# Patient Record
Sex: MALE | Race: BLACK OR AFRICAN AMERICAN | Employment: OTHER | URBAN - METROPOLITAN AREA
[De-identification: names, ages, dates, MRNs, and addresses within clinical notes are randomized per-mention and may not be internally consistent; named-entity substitution may affect disease eponyms.]

---

## 2018-05-09 ENCOUNTER — TELEPHONE (OUTPATIENT)
Dept: CARDIOLOGY CLINIC | Age: 83
End: 2018-05-09

## 2018-05-09 NOTE — TELEPHONE ENCOUNTER
LMOM of medical records/ appt  at [facility] and [address] [place] [address] to obtain [place] insurance referral for patient.

## 2018-05-11 ENCOUNTER — OFFICE VISIT (OUTPATIENT)
Dept: CARDIOLOGY CLINIC | Age: 83
End: 2018-05-11

## 2018-05-11 VITALS
BODY MASS INDEX: 18.06 KG/M2 | HEART RATE: 97 BPM | HEIGHT: 71 IN | DIASTOLIC BLOOD PRESSURE: 75 MMHG | WEIGHT: 129 LBS | SYSTOLIC BLOOD PRESSURE: 141 MMHG | OXYGEN SATURATION: 98 %

## 2018-05-11 DIAGNOSIS — R55 PRE-SYNCOPE: ICD-10-CM

## 2018-05-11 DIAGNOSIS — I10 ESSENTIAL HYPERTENSION: Primary | ICD-10-CM

## 2018-05-11 RX ORDER — QUETIAPINE FUMARATE 25 MG/1
TABLET, FILM COATED ORAL
COMMUNITY

## 2018-05-11 RX ORDER — LAMOTRIGINE 200 MG/1
TABLET ORAL DAILY
COMMUNITY

## 2018-05-11 RX ORDER — ATORVASTATIN CALCIUM 10 MG/1
TABLET, FILM COATED ORAL DAILY
COMMUNITY

## 2018-05-11 RX ORDER — POTASSIUM CHLORIDE 750 MG/1
TABLET, FILM COATED, EXTENDED RELEASE ORAL
COMMUNITY

## 2018-05-11 NOTE — MR AVS SNAPSHOT
[address] [facility] [address] [facility] [address] [id number] 
[phone] Patient: Ced Graham MRN: [id number] :1935 Visit Information Date & Time Provider Department Dept. Phone Encounter #  
 2018  1:30 PM Scotty Brady  [facility] Specialist at [facility] [phone] [id number] Follow-up Instructions Return in about 4 weeks (around 2018). Upcoming Health Maintenance Date Due DTaP/Tdap/Td series (1 - Tdap) 1956 ZOSTER VACCINE AGE 60> 1996 GLAUCOMA SCREENING Q2Y 2001 Pneumococcal 65+ Low/Medium Risk (1 of 2 - PCV13) 2001 MEDICARE YEARLY EXAM 3/28/2018 Influenza Age 5 to Adult 2018 Allergies as of 2018  Review Complete On: 2018 By: Cherry Goodwin RN No Known Allergies Current Immunizations  Never Reviewed Name Date Influenza Vaccine PF 2013  4:34 PM  
  
 Not reviewed this visit You Were Diagnosed With   
  
 Codes Comments Essential hypertension    -  Primary ICD-10-CM: I10 
ICD-9-CM: 401.9 Pre-syncope     ICD-10-CM: R55 
ICD-9-CM: 780. 2 Vitals BP Pulse Height(growth percentile) Weight(growth percentile) SpO2 BMI  
 141/75 97 5' 11\" (1.803 m) 129 lb (58.5 kg) 98% 17.99 kg/m2 Smoking Status Never Smoker BMI and BSA Data Body Mass Index Body Surface Area  
 17.99 kg/m 2 1.71 m 2 Your Updated Medication List  
  
   
This list is accurate as of 18  2:05 PM.  Always use your most recent med list. amLODIPine 10 mg tablet Commonly known as:  Erenest Bryon Take 1 Tab by mouth daily. aspirin 81 mg chewable tablet Take 1 Tab by mouth daily. cholecalciferol 1,000 unit tablet Commonly known as:  VITAMIN D3 Take 1 Tab by mouth daily. citalopram 20 mg tablet Commonly known as:  Sable Deaner Take 1 Tab by mouth daily. donepezil 5 mg tablet Commonly known as:  ARICEPT Take 1 Tab by mouth nightly. LaMICtal 200 mg tablet Generic drug:  lamoTRIgine Take  by mouth daily. LIPITOR 10 mg tablet Generic drug:  atorvastatin Take  by mouth daily. lisinopril 40 mg tablet Commonly known as:  Connor Tomy Take 1 Tab by mouth daily. LORazepam 0.5 mg tablet Commonly known as:  ATIVAN Take 1 Tab by mouth two (2) times daily as needed for Anxiety. potassium chloride SR 10 mEq tablet Commonly known as:  KLOR-CON 10 Take  by mouth. QUEtiapine 25 mg tablet Commonly known as:  SEROquel Take  by mouth. simvastatin 10 mg tablet Commonly known as:  ZOCOR Take 1 Tab by mouth nightly. We Performed the Following AMB POC EKG ROUTINE W/ 12 LEADS, INTER & REP [41424 CPT(R)] Follow-up Instructions Return in about 4 weeks (around 6/8/2018). Introducing [facility]! Kirill Johnson introduces BUILD patient portal. Now you can access parts of your medical record, email your doctor's office, and request medication refills online. 1. In your internet browser, go to [email or web address] 2. Click on the First Time User? Click Here link in the Sign In box. You will see the New Member Sign Up page. 3. Enter your BUILD Access Code exactly as it appears below. You will not need to use this code after youve completed the sign-up process. If you do not sign up before the expiration date, you must request a new code. · BUILD Access Code: [email or web address] Expires: 8/9/2018  2:05 PM 
 
4. Enter the last four digits of your Social Security Number (xxxx) and Date of Birth (mm/dd/yyyy) as indicated and click Submit. You will be taken to the next sign-up page. 5. Create a BUILD ID. This will be your BUILD login ID and cannot be changed, so think of one that is secure and easy to remember. 6. Create a Tripl password. You can change your password at any time. 7. Enter your Password Reset Question and Answer. This can be used at a later time if you forget your password. 8. Enter your e-mail address. You will receive e-mail notification when new information is available in [address] 9. Click Sign Up. You can now view and download portions of your medical record. 10. Click the Download Summary menu link to download a portable copy of your medical information. If you have questions, please visit the Frequently Asked Questions section of the Tripl website. Remember, Tripl is NOT to be used for urgent needs. For medical emergencies, dial 911. Now available from your iPhone and Android! Please provide this summary of care documentation to your next provider. Your primary care clinician is listed as Jairo Boyce. If you have any questions after today's visit, please call [phone].

## 2018-05-11 NOTE — PROGRESS NOTES
Cardiovascular Specialists    Mr. Kevon Hickman is an 81year old male with a history of Alzheimer's dementia, adjustment disorder, hypertension. Mr. Kevon Hickman is here in my clinic today along with the niece. Mr. Kevon Hickman lives in a nursing home. According to niece, he was asked to come see us for abnormal EKG. Exact history is not available for the reason behind EKG. According to Mr. Kevon Hickman and the niece, he does not have any prior history of myocardial infarction or congestive heart failure. He denies any chest pain or chest tightness. He uses 1-2 pillows at night. He has some baseline dementia. According to niece, for the last five years he has been experiencing episodes where he becomes suddenly disoriented, altered mental status and fatigued and tired. There is no associated hemodynamic instability. This episode lasts probably 30-60 minutes, where he has to sit down and relax. According to niece, occasionally he would have bladder incontinence during these events. This happens probably once a month or once every other month. This has been going on for the last five years. However according to niece he never loses consciousness, he just becomes disoriented. Denies any nausea, vomiting, abdominal pain, fever, chills, sputum production. No hematuria or other bleeding complaints    Past Medical History:   Diagnosis Date    Adjustment disorder     Alzheimer's dementia     Hypertension     Vitamin D deficiency          No past surgical history on file. Current Outpatient Prescriptions   Medication Sig    lamoTRIgine (LAMICTAL) 200 mg tablet Take  by mouth daily.  potassium chloride SR (KLOR-CON 10) 10 mEq tablet Take  by mouth.  QUEtiapine (SEROQUEL) 25 mg tablet Take  by mouth.  atorvastatin (LIPITOR) 10 mg tablet Take  by mouth daily.  cholecalciferol (VITAMIN D3) 1,000 unit tablet Take 1 Tab by mouth daily.     donepezil (ARICEPT) 5 mg tablet Take 1 Tab by mouth nightly.  aspirin 81 mg chewable tablet Take 1 Tab by mouth daily.  amLODIPine (NORVASC) 10 mg tablet Take 1 Tab by mouth daily.  citalopram (CELEXA) 20 mg tablet Take 1 Tab by mouth daily.  lisinopril (PRINIVIL, ZESTRIL) 40 mg tablet Take 1 Tab by mouth daily.  LORazepam (ATIVAN) 0.5 mg tablet Take 1 Tab by mouth two (2) times daily as needed for Anxiety.  simvastatin (ZOCOR) 10 mg tablet Take 1 Tab by mouth nightly. No current facility-administered medications for this visit. Allergies and Sensitivities:  No Known Allergies    Family History:  No family history on file. Social History:  Social History   Substance Use Topics    Smoking status: Never Smoker    Smokeless tobacco: Never Used    Alcohol use None     He  reports that he has never smoked. He has never used smokeless tobacco.  He  has no alcohol history on file. Review of Systems:  Cardiac symptoms as noted above in HPI. All others negative. Denies fatigue, malaise, skin rash, joint pain, blurring vision, photophobia, neck pain, hemoptysis, chronic cough, nausea, vomiting, hematuria, burning micturition, BRBPR, chronic headaches. Physical Exam:  BP Readings from Last 3 Encounters:   05/11/18 141/75   11/04/13 154/73         Pulse Readings from Last 3 Encounters:   05/11/18 97   11/04/13 61          Wt Readings from Last 3 Encounters:   05/11/18 129 lb (58.5 kg)   11/04/13 130 lb 12.8 oz (59.3 kg)       Constitutional: Oriented to person, place, and time. HENT: Head: Normocephalic and atraumatic. Eyes: Conjunctivae and extraocular motions are normal.   Neck: No JVD present. Carotid bruit is not appreciated. Cardiovascular: Regular rhythm. No murmur, gallop or rubs appreciated  Lung: Breath sounds normal. No respiratory distress. No ronchi or rales appreciated  Abdominal: No tenderness. No rebound and no guarding. Musculoskeletal: There is no lower extremity edema.  No cynosis  Lymphadenopathy:  No cervical or supraclavicular adenopathy appriciated. Neurological: No gross motor deficit noted. Skin: No visible skin rash noted. No Ear discharge noted  Psychiatric: Normal mood and affect. Good distal pulse    Review of Data  LABS:   Lab Results   Component Value Date/Time    Sodium 145 11/04/2013 04:13 AM    Potassium 3.1 (L) 11/04/2013 04:13 AM    Chloride 105 11/04/2013 04:13 AM    CO2 33 (H) 11/04/2013 04:13 AM    Glucose 93 11/04/2013 04:13 AM    BUN 22 (H) 11/04/2013 04:13 AM    Creatinine 0.98 11/04/2013 04:13 AM     No flowsheet data found. Lab Results   Component Value Date/Time    ALT (SGPT) 14 11/03/2013 02:10 AM     Lab Results   Component Value Date/Time    Hemoglobin A1c 5.6 11/02/2013 10:15 AM       EKG  (05/18) Sinus rhythm at 80 bpm. LVH with repolarization abnormality. Possible old septal infarct. ECHO    IMPRESSION & PLAN:  Mr. Raeann Bridges is an 81year old male with a history of hypertension, Alzheimer's disease and dementia. Abnormal EKG:  I reviewed EKG from nursing home and also EKG from today. EKG showed sinus rhythm with left ventricular hypertrophy and secondary ST-T changes, possible old anteroseptal infarct, however there is no prior history of clinical MI. Echocardiogram will be performed to evaluate his ejection fraction and any wall motion abnormality in the setting of hypertension. Alzheimer's dementia: Mr. Raeann Bridges has Alzheimer's disease and some dementia. He is accompanied with the family member. According to niece, he has been experiencing this episode of disorientation and altered level of consciousness along with occasional bladder incontinence for the last four to five years. According to niece, no further diagnosis has been made. She tells me that she does not believe he has been seen by neurology in the past.  Considering his symptoms, I believe this could be some sort of atypical seizure disorder as well.   I recommend that he should be seen by neurology team.  Antony is in the healthcare field and is a CNA and is going to discuss this with nursing home physician and recommend neurology evaluation. Hypertension:  Currently he is on Amlodipine 10 mg daily. Echocardiogram has been ordered to rule out hypertensive cardiovascular heart disease. Continue Amlodipine at this time. BP today is 140/75 mmHg. This plan was discussed with patient and niece who is in agreement. Thank you for allowing me to participate in patient care. Please feel free to call me if you have any question or concern. Meghann Benjamin MD  Please note: This document has been produced using voice recognition software. Unrecognized errors in transcription may be present.

## 2018-05-15 DIAGNOSIS — G40.909 SEIZURE DISORDER (HCC): Primary | ICD-10-CM

## 2018-05-15 NOTE — PROGRESS NOTES
Sleep Study referral to Dr. Crystal Edwards per Dr. Santiago Vega.        Verbal order and read back per Nevaeh Cruz MD

## 2018-06-13 ENCOUNTER — HOSPITAL ENCOUNTER (OUTPATIENT)
Dept: NON INVASIVE DIAGNOSTICS | Age: 83
Discharge: HOME | End: 2018-06-13
Attending: INTERNAL MEDICINE
Payer: MEDICARE

## 2018-06-13 DIAGNOSIS — I10 ESSENTIAL HYPERTENSION: ICD-10-CM

## 2018-06-13 DIAGNOSIS — R55 PRE-SYNCOPE: ICD-10-CM

## 2018-06-13 PROCEDURE — 93306 TTE W/DOPPLER COMPLETE: CPT

## 2018-06-15 ENCOUNTER — OFFICE VISIT (OUTPATIENT)
Dept: CARDIOLOGY CLINIC | Age: 83
End: 2018-06-15

## 2018-06-15 VITALS
HEIGHT: 71 IN | OXYGEN SATURATION: 98 % | SYSTOLIC BLOOD PRESSURE: 151 MMHG | HEART RATE: 81 BPM | WEIGHT: 129 LBS | DIASTOLIC BLOOD PRESSURE: 75 MMHG | BODY MASS INDEX: 18.06 KG/M2

## 2018-06-15 DIAGNOSIS — I10 HYPERTENSION, UNSPECIFIED TYPE: Primary | ICD-10-CM

## 2018-06-15 DIAGNOSIS — E78.5 DYSLIPIDEMIA: ICD-10-CM

## 2018-06-15 RX ORDER — LISINOPRIL 2.5 MG/1
2.5 TABLET ORAL DAILY
Qty: 30 TAB | Refills: 6 | Status: SHIPPED | OUTPATIENT
Start: 2018-06-15 | End: 2018-06-18 | Stop reason: ALTCHOICE

## 2018-06-15 NOTE — MR AVS SNAPSHOT
[address] [facility] [address] [facility] [address] 
[phone] Patient: Dmitry Ramos MRN: [id number] :1935 Visit Information Date & Time Provider Department Dept. Phone Encounter #  
 6/15/2018  1:45 PM Scotty Finn  [facility] Specialist at [facility] [phone] [id number] Your Appointments 2018 11:00 AM  
New Patient with Dieudonne Ordaz MD  
[employer] [facility] Appt Note: Dr. Santiago Vega; dementia & poss dementia; notes in cc; [address] [phone] ext [phone]; forms faxed to Nursing facility. .... Cheryl Tellez ywp; aware family member to attend appt w/pt. ..ywp; Home called and moved to [address] [place] [id number] [phone]  
  
   
 [facility] [id number] Upcoming Health Maintenance Date Due DTaP/Tdap/Td series (1 - Tdap) 1957 ZOSTER VACCINE AGE 60> 1996 GLAUCOMA SCREENING Q2Y 2001 Pneumococcal 65+ Low/Medium Risk (1 of 2 - PCV13) 2001 Influenza Age 5 to Adult 2018 Allergies as of 6/15/2018  Review Complete On: 6/15/2018 By: Melanie Isbell RN No Known Allergies Current Immunizations  Never Reviewed Name Date Influenza Vaccine PF 2013  4:34 PM  
  
 Not reviewed this visit You Were Diagnosed With   
  
 Codes Comments Hypertension, unspecified type    -  Primary ICD-10-CM: I10 
ICD-9-CM: 401.9 Dyslipidemia     ICD-10-CM: E78.5 ICD-9-CM: 272.4 Vitals BP Pulse Height(growth percentile) Weight(growth percentile) SpO2 BMI  
 151/75 81 5' 11\" (1.803 m) 129 lb (58.5 kg) 98% 17.99 kg/m2 Smoking Status Never Smoker BMI and BSA Data Body Mass Index Body Surface Area  
 17.99 kg/m 2 1.71 m 2 Your Updated Medication List  
  
   
This list is accurate as of 6/15/18  2:49 PM.  Always use your most recent med list.  
  
  
  
 amLODIPine 10 mg tablet Commonly known as:  Jose Alberto Montano Take 1 Tab by mouth daily. aspirin 81 mg chewable tablet Take 1 Tab by mouth daily. cholecalciferol 1,000 unit tablet Commonly known as:  VITAMIN D3 Take 1 Tab by mouth daily. citalopram 20 mg tablet Commonly known as:  Leonel Hopes Take 1 Tab by mouth daily. donepezil 5 mg tablet Commonly known as:  ARICEPT Take 1 Tab by mouth nightly. LaMICtal 200 mg tablet Generic drug:  lamoTRIgine Take  by mouth daily. LIPITOR 10 mg tablet Generic drug:  atorvastatin Take  by mouth daily. lisinopril 2.5 mg tablet Commonly known as:  Lauren Denver Take 1 Tab by mouth daily. LORazepam 0.5 mg tablet Commonly known as:  ATIVAN Take 1 Tab by mouth two (2) times daily as needed for Anxiety. potassium chloride SR 10 mEq tablet Commonly known as:  KLOR-CON 10 Take  by mouth. QUEtiapine 25 mg tablet Commonly known as:  SEROquel Take  by mouth. simvastatin 10 mg tablet Commonly known as:  ZOCOR Take 1 Tab by mouth nightly. Prescriptions Printed Refills  
 lisinopril (PRINIVIL, ZESTRIL) 2.5 mg tablet 6 Sig: Take 1 Tab by mouth daily. Class: Print Route: Oral  
  
Introducing [facility]! Omid Kauffman introduces [employer] patient portal. Now you can access parts of your medical record, email your doctor's office, and request medication refills online. 1. In your internet browser, go to [email or web address] 2. Click on the First Time User? Click Here link in the Sign In box. You will see the New Member Sign Up page. 3. Enter your [employer] Access Code exactly as it appears below. You will not need to use this code after youve completed the sign-up process. If you do not sign up before the expiration date, you must request a new code. · [employer] Access Code: [email or web address] Expires: 8/9/2018  2:05 PM 
 
 4. Enter the last four digits of your Social Security Number (xxxx) and Date of Birth (mm/dd/yyyy) as indicated and click Submit. You will be taken to the next sign-up page. 5. Create a Denator ID. This will be your Denator login ID and cannot be changed, so think of one that is secure and easy to remember. 6. Create a Denator password. You can change your password at any time. 7. Enter your Password Reset Question and Answer. This can be used at a later time if you forget your password. 8. Enter your e-mail address. You will receive e-mail notification when new information is available in [address] 9. Click Sign Up. You can now view and download portions of your medical record. 10. Click the Download Summary menu link to download a portable copy of your medical information. If you have questions, please visit the Frequently Asked Questions section of the Denator website. Remember, Denator is NOT to be used for urgent needs. For medical emergencies, dial 911. Now available from your iPhone and Android! Please provide this summary of care documentation to your next provider. Your primary care clinician is listed as No Schneider. If you have any questions after today's visit, please call [phone].

## 2018-06-15 NOTE — PROGRESS NOTES
Cardiovascular Specialists    Mr. Dana Pike is an 81 y.o. male with a history of cardiomyopathy, Alzheimer's dementia, adjustment disorder, hypertension. Mr. Dana Pike is here today for follow up appointment. He had an echocardiogram done since last visit. He has no symptoms to report. He has Alzheimer's dementia. He is accompanied with the CNA from the nursing home. Mr. Dana Pike denies any symptoms to suggest angina or heart failure. He uses one pillow at night. He denies any palpitations, presyncope or syncope. He denies any lower extremity swelling. Denies any nausea, vomiting, abdominal pain, fever, chills, sputum production. No hematuria or other bleeding complaints    Past Medical History:   Diagnosis Date    Adjustment disorder     Alzheimer's dementia     [facility]     LVEF 45% (06/18)     Hypertension     Vitamin D deficiency          No past surgical history on file. Current Outpatient Prescriptions   Medication Sig    lamoTRIgine (LAMICTAL) 200 mg tablet Take  by mouth daily.  potassium chloride SR (KLOR-CON 10) 10 mEq tablet Take  by mouth.  QUEtiapine (SEROQUEL) 25 mg tablet Take  by mouth.  atorvastatin (LIPITOR) 10 mg tablet Take  by mouth daily.  cholecalciferol (VITAMIN D3) 1,000 unit tablet Take 1 Tab by mouth daily.  aspirin 81 mg chewable tablet Take 1 Tab by mouth daily.  amLODIPine (NORVASC) 10 mg tablet Take 1 Tab by mouth daily.  citalopram (CELEXA) 20 mg tablet Take 1 Tab by mouth daily.  donepezil (ARICEPT) 5 mg tablet Take 1 Tab by mouth nightly.  lisinopril (PRINIVIL, ZESTRIL) 40 mg tablet Take 1 Tab by mouth daily.  LORazepam (ATIVAN) 0.5 mg tablet Take 1 Tab by mouth two (2) times daily as needed for Anxiety.  simvastatin (ZOCOR) 10 mg tablet Take 1 Tab by mouth nightly. No current facility-administered medications for this visit.         Allergies and Sensitivities:  No Known Allergies    Family History:  No family history on file. Social History:  Social History   Substance Use Topics    Smoking status: Never Smoker    Smokeless tobacco: Never Used    Alcohol use None     He  reports that he has never smoked. He has never used smokeless tobacco.  He  has no alcohol history on file. Review of Systems:  Cardiac symptoms as noted above in HPI. All others negative. Denies  blurring vision, photophobia, neck pain, hemoptysis, chronic cough, nausea, vomiting, hematuria, burning micturition, BRBPR, chronic headaches. Physical Exam:  BP Readings from Last 3 Encounters:   06/15/18 151/75   05/11/18 141/75   11/04/13 154/73         Pulse Readings from Last 3 Encounters:   06/15/18 81   05/11/18 97   11/04/13 61          Wt Readings from Last 3 Encounters:   06/15/18 129 lb (58.5 kg)   05/11/18 129 lb (58.5 kg)   11/04/13 130 lb 12.8 oz (59.3 kg)       Constitutional: Oriented to person, place, and time. HENT: Head: Normocephalic and atraumatic. Neck: No JVD present. Cardiovascular: Regular rhythm. No murmur, gallop or rubs appreciated  Lung: Breath sounds normal. No respiratory distress. No ronchi or rales appreciated  Abdominal: No tenderness. No rebound and no guarding. Musculoskeletal: There is no lower extremity edema. No cynosis      Review of Data  LABS:   Lab Results   Component Value Date/Time    Sodium 145 11/04/2013 04:13 AM    Potassium 3.1 (L) 11/04/2013 04:13 AM    Chloride 105 11/04/2013 04:13 AM    CO2 33 (H) 11/04/2013 04:13 AM    Glucose 93 11/04/2013 04:13 AM    BUN 22 (H) 11/04/2013 04:13 AM    Creatinine 0.98 11/04/2013 04:13 AM     No flowsheet data found. Lab Results   Component Value Date/Time    ALT (SGPT) 14 11/03/2013 02:10 AM     Lab Results   Component Value Date/Time    Hemoglobin A1c 5.6 11/02/2013 10:15 AM       EKG  (05/18) Sinus rhythm at 80 bpm. LVH with repolarization abnormality. Possible old septal infarct.      ECHO (05/18)  LEFT VENTRICLE: Size was normal. Ejection fraction was estimated to be 45 %. There was diffuse hypokinesis. RIGHT VENTRICLE: The size was normal. Systolic function was normal. There   were no regional wall motion abnormalities. LEFT ATRIUM: Size was normal.  RIGHT ATRIUM: Size was normal.  MITRAL VALVE: Normal valve structure. DOPPLER: There was trivial regurgitation. AORTIC VALVE: Leaflets exhibited mildly increased thickness. DOPPLER: There was mild to moderate regurgitation. IMPRESSION & PLAN:  Mr. Sarah Salas is an 81 y.o. male with a history of hypertension, Alzheimer's disease and dementia. 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  Salt restriction advised. Hypertension:  Currently he is on Amlodipine 10 mg daily. Starting lisinopril 2.5 mg daily for LV dysfunction. .    Thank you for allowing me to participate in patient care. Please feel free to call me if you have any question or concern. Jordi Coombs MD  Please note: This document has been produced using voice recognition software. Unrecognized errors in transcription may be present.

## 2018-06-18 RX ORDER — LOSARTAN POTASSIUM 100 MG/1
100 TABLET ORAL DAILY
Qty: 30 TAB | Refills: 6 | Status: CANCELLED | OUTPATIENT
Start: 2018-06-18

## 2018-06-18 RX ORDER — LOSARTAN POTASSIUM 25 MG/1
25 TABLET ORAL DAILY
Qty: 30 TAB | Refills: 6 | Status: SHIPPED | OUTPATIENT
Start: 2018-06-18

## 2018-06-18 RX ORDER — LOSARTAN POTASSIUM 25 MG/1
TABLET ORAL DAILY
COMMUNITY
End: 2018-06-18 | Stop reason: SDUPTHER

## 2018-06-18 NOTE — TELEPHONE ENCOUNTER
Radha De Leon called from [facility] [phone] to advise that they have Lisinopril listed as an allergy for patient. She advised that she does not have reaction because patient came to their facility with Lisinopril listed as allergy. Lisinopril was prescribed by Dr. Juana Ortega during last office visit because we were unaware of allergy at that time. Facility is requesting new Rx. Per Dr. Juana Ortega, change to Cozaar 25 mg daily. Will fax Rx to [phone].      Verbal order and read back per Finn Lemos MD

## 2018-07-06 ENCOUNTER — HOSPITAL ENCOUNTER (EMERGENCY)
Age: 83
Discharge: HOME | End: 2018-07-06
Attending: EMERGENCY MEDICINE | Admitting: EMERGENCY MEDICINE
Payer: MEDICARE

## 2018-07-06 ENCOUNTER — APPOINTMENT (OUTPATIENT)
Dept: GENERAL RADIOLOGY | Age: 83
End: 2018-07-06
Attending: EMERGENCY MEDICINE
Payer: MEDICARE

## 2018-07-06 VITALS
TEMPERATURE: 97.3 F | HEART RATE: 70 BPM | RESPIRATION RATE: 18 BRPM | SYSTOLIC BLOOD PRESSURE: 126 MMHG | DIASTOLIC BLOOD PRESSURE: 59 MMHG | OXYGEN SATURATION: 100 %

## 2018-07-06 DIAGNOSIS — R55 SYNCOPE AND COLLAPSE: ICD-10-CM

## 2018-07-06 DIAGNOSIS — F02.80 OTHER FRONTOTEMPORAL DEMENTIA WITHOUT BEHAVIORAL DISTURBANCE: ICD-10-CM

## 2018-07-06 DIAGNOSIS — G31.09 OTHER FRONTOTEMPORAL DEMENTIA WITHOUT BEHAVIORAL DISTURBANCE: ICD-10-CM

## 2018-07-06 DIAGNOSIS — R00.1 BRADYCARDIA: Primary | ICD-10-CM

## 2018-07-06 LAB
ALBUMIN SERPL-MCNC: 3.6 G/DL (ref 3.4–5)
ALBUMIN/GLOB SERPL: 1.2 {RATIO} (ref 0.8–1.7)
ALP SERPL-CCNC: 114 U/L (ref 45–117)
ALT SERPL-CCNC: 16 U/L (ref 16–61)
ANION GAP SERPL CALC-SCNC: 9 MMOL/L (ref 3–18)
AST SERPL-CCNC: 15 U/L (ref 15–37)
ATRIAL RATE: 68 BPM
BASOPHILS # BLD: 0 K/UL (ref 0–0.06)
BASOPHILS NFR BLD: 0 % (ref 0–2)
BILIRUB SERPL-MCNC: 0.4 MG/DL (ref 0.2–1)
BUN SERPL-MCNC: 17 MG/DL (ref 7–18)
BUN/CREAT SERPL: 13 (ref 12–20)
CALCIUM SERPL-MCNC: 8.3 MG/DL (ref 8.5–10.1)
CALCULATED P AXIS, ECG09: 41 DEGREES
CALCULATED R AXIS, ECG10: -57 DEGREES
CALCULATED T AXIS, ECG11: -142 DEGREES
CHLORIDE SERPL-SCNC: 108 MMOL/L (ref 100–108)
CO2 SERPL-SCNC: 29 MMOL/L (ref 21–32)
CREAT SERPL-MCNC: 1.33 MG/DL (ref 0.6–1.3)
DIAGNOSIS, 93000: NORMAL
DIFFERENTIAL METHOD BLD: ABNORMAL
EOSINOPHIL # BLD: 0.1 K/UL (ref 0–0.4)
EOSINOPHIL NFR BLD: 1 % (ref 0–5)
ERYTHROCYTE [DISTWIDTH] IN BLOOD BY AUTOMATED COUNT: 15.1 % (ref 11.6–14.5)
GLOBULIN SER CALC-MCNC: 3 G/DL (ref 2–4)
GLUCOSE SERPL-MCNC: 149 MG/DL (ref 74–99)
HCT VFR BLD AUTO: 40.1 % (ref 36–48)
HGB BLD-MCNC: 13 G/DL (ref 13–16)
INR PPP: 1 (ref 0.8–1.2)
LYMPHOCYTES # BLD: 2.1 K/UL (ref 0.9–3.6)
LYMPHOCYTES NFR BLD: 44 % (ref 21–52)
MCH RBC QN AUTO: 26.7 PG (ref 24–34)
MCHC RBC AUTO-ENTMCNC: 32.4 G/DL (ref 31–37)
MCV RBC AUTO: 82.5 FL (ref 74–97)
MONOCYTES # BLD: 0.5 K/UL (ref 0.05–1.2)
MONOCYTES NFR BLD: 10 % (ref 3–10)
NEUTS SEG # BLD: 2.1 K/UL (ref 1.8–8)
NEUTS SEG NFR BLD: 45 % (ref 40–73)
P-R INTERVAL, ECG05: 162 MS
PLATELET # BLD AUTO: 246 K/UL (ref 135–420)
PMV BLD AUTO: 9.9 FL (ref 9.2–11.8)
POTASSIUM SERPL-SCNC: 3.4 MMOL/L (ref 3.5–5.5)
PROT SERPL-MCNC: 6.6 G/DL (ref 6.4–8.2)
PROTHROMBIN TIME: 13 SEC (ref 11.5–15.2)
Q-T INTERVAL, ECG07: 452 MS
QRS DURATION, ECG06: 138 MS
QTC CALCULATION (BEZET), ECG08: 480 MS
RBC # BLD AUTO: 4.86 M/UL (ref 4.7–5.5)
SODIUM SERPL-SCNC: 146 MMOL/L (ref 136–145)
VENTRICULAR RATE, ECG03: 68 BPM
WBC # BLD AUTO: 4.7 K/UL (ref 4.6–13.2)

## 2018-07-06 PROCEDURE — 93005 ELECTROCARDIOGRAM TRACING: CPT

## 2018-07-06 PROCEDURE — 99285 EMERGENCY DEPT VISIT HI MDM: CPT

## 2018-07-06 PROCEDURE — 85025 COMPLETE CBC W/AUTO DIFF WBC: CPT | Performed by: EMERGENCY MEDICINE

## 2018-07-06 PROCEDURE — 71045 X-RAY EXAM CHEST 1 VIEW: CPT

## 2018-07-06 PROCEDURE — 85610 PROTHROMBIN TIME: CPT | Performed by: EMERGENCY MEDICINE

## 2018-07-06 PROCEDURE — 80053 COMPREHEN METABOLIC PANEL: CPT | Performed by: EMERGENCY MEDICINE

## 2018-07-06 NOTE — ED PROVIDER NOTES
EMERGENCY DEPARTMENT HISTORY AND PHYSICAL EXAM    10:51 AM      Date: 7/6/2018  Patient Name: Mark Calixto    History of Presenting Illness     Chief Complaint   Patient presents with    Syncope     History Provided By: Patient and EMS    Chief Complaint: syncope  Duration:  Seconds  Timing:  Acute  Location: generalized  Quality: N/A  Severity: N/A  Modifying Factors: Nothing makes better or worse  Associated Symptoms: denies any other associated signs or symptoms      Additional History (Context): Mark Calixto is a 81 y.o. male with hypertension and HLD, alzheimers, depression who presents with an episode of syncope that occurred this morning just prior to arrival. Has had these episodes in the past. Per his family member who is at the bedside, the patient has a history of progressive dementia and has had these syncopal episodes many times in the past. They are currently being evaluated and in fact the patient was at a doctor's appointment when today's episode occurred. Usually the patient is fine after resting quietly for a few seconds. Today's episode was similar to his usual episodes. PCP: Eber Mcnair MD    Current Outpatient Prescriptions   Medication Sig Dispense Refill    losartan (COZAAR) 25 mg tablet Take 1 Tab by mouth daily. 30 Tab 6    lamoTRIgine (LAMICTAL) 200 mg tablet Take  by mouth daily.  potassium chloride SR (KLOR-CON 10) 10 mEq tablet Take  by mouth.  QUEtiapine (SEROQUEL) 25 mg tablet Take  by mouth.  atorvastatin (LIPITOR) 10 mg tablet Take  by mouth daily.  cholecalciferol (VITAMIN D3) 1,000 unit tablet Take 1 Tab by mouth daily. 30 Tab 0    citalopram (CELEXA) 20 mg tablet Take 1 Tab by mouth daily. 30 Tab 0    donepezil (ARICEPT) 5 mg tablet Take 1 Tab by mouth nightly. 30 Tab 0    LORazepam (ATIVAN) 0.5 mg tablet Take 1 Tab by mouth two (2) times daily as needed for Anxiety. 30 Tab 0    simvastatin (ZOCOR) 10 mg tablet Take 1 Tab by mouth nightly.  27 Tab 0    aspirin 81 mg chewable tablet Take 1 Tab by mouth daily. 30 Tab 0    amLODIPine (NORVASC) 10 mg tablet Take 1 Tab by mouth daily. 30 Tab 0       Past History     Past Medical History:  Past Medical History:   Diagnosis Date    Adjustment disorder     Alzheimer's dementia     Cardiomyopathy ([facility] [address]     LVEF 45% (06/18)     Hypertension     Vitamin D deficiency        Past Surgical History:  History reviewed. No pertinent surgical history. Family History:  History reviewed. No pertinent family history. Social History:  Social History   Substance Use Topics    Smoking status: Never Smoker    Smokeless tobacco: Never Used    Alcohol use None       Allergies: Allergies   Allergen Reactions    Lisinopril Unknown (comments)         Review of Systems       Review of Systems   Unable to perform ROS: Dementia         Physical Exam     Visit Vitals    /59    Pulse 70    Temp 97.3 °F (36.3 °C)    Resp 18    SpO2 100%       Physical Exam   Constitutional: No distress. HENT:   Head: Normocephalic and atraumatic. Mouth/Throat: Oropharynx is clear and moist. No oropharyngeal exudate. Eyes: Pupils are equal, round, and reactive to light. Right eye exhibits no discharge. Left eye exhibits no discharge. Neck: Normal range of motion. No JVD present. No tracheal deviation present. Cardiovascular: Normal rate, regular rhythm, normal heart sounds and intact distal pulses. Pulmonary/Chest: Effort normal and breath sounds normal. No stridor. No respiratory distress. He has no wheezes. Abdominal: Soft. Bowel sounds are normal. He exhibits no distension. There is no tenderness. There is no guarding. Musculoskeletal: Normal range of motion. He exhibits no edema or tenderness. Neurological: He is alert. A&O x 0, at baseline   Skin: Skin is warm and dry. No rash noted. He is not diaphoretic. No erythema. No pallor. Nursing note and vitals reviewed.         Diagnostic Study Results Labs -  Recent Results (from the past 12 hour(s))   EKG, 12 LEAD, INITIAL    Collection Time: 07/06/18 10:58 AM   Result Value Ref Range    Ventricular Rate 68 BPM    Atrial Rate 68 BPM    P-R Interval 162 ms    QRS Duration 138 ms    Q-T Interval 452 ms    QTC Calculation (Bezet) 480 ms    Calculated P Axis 41 degrees    Calculated R Axis -57 degrees    Calculated T Axis -142 degrees    Diagnosis       Normal sinus rhythm  Left axis deviation  Left ventricular hypertrophy with QRS widening and repolarization abnormality  Abnormal ECG  When compared with ECG of 03-NOV-2013 06:55,  QRS duration has increased  Minimal criteria for Anteroseptal infarct are no longer present  T wave inversion now evident in Inferior leads     CBC WITH AUTOMATED DIFF    Collection Time: 07/06/18 11:08 AM   Result Value Ref Range    WBC 4.7 4.6 - 13.2 K/uL    RBC 4.86 4.70 - 5.50 M/uL    HGB 13.0 13.0 - 16.0 g/dL    HCT 40.1 36.0 - 48.0 %    MCV 82.5 74.0 - 97.0 FL    MCH 26.7 24.0 - 34.0 PG    MCHC 32.4 31.0 - 37.0 g/dL    RDW 15.1 (H) 11.6 - 14.5 %    PLATELET 989 283 - 765 K/uL    MPV 9.9 9.2 - 11.8 FL    NEUTROPHILS 45 40 - 73 %    LYMPHOCYTES 44 21 - 52 %    MONOCYTES 10 3 - 10 %    EOSINOPHILS 1 0 - 5 %    BASOPHILS 0 0 - 2 %    ABS. NEUTROPHILS 2.1 1.8 - 8.0 K/UL    ABS. LYMPHOCYTES 2.1 0.9 - 3.6 K/UL    ABS. MONOCYTES 0.5 0.05 - 1.2 K/UL    ABS. EOSINOPHILS 0.1 0.0 - 0.4 K/UL    ABS.  BASOPHILS 0.0 0.0 - 0.06 K/UL    DF AUTOMATED     METABOLIC PANEL, COMPREHENSIVE    Collection Time: 07/06/18 11:08 AM   Result Value Ref Range    Sodium 146 (H) 136 - 145 mmol/L    Potassium 3.4 (L) 3.5 - 5.5 mmol/L    Chloride 108 100 - 108 mmol/L    CO2 29 21 - 32 mmol/L    Anion gap 9 3.0 - 18 mmol/L    Glucose 149 (H) 74 - 99 mg/dL    BUN 17 7.0 - 18 MG/DL    Creatinine 1.33 (H) 0.6 - 1.3 MG/DL    BUN/Creatinine ratio 13 12 - 20      GFR est AA >60 >60 ml/min/1.73m2    GFR est non-AA 51 (L) >60 ml/min/1.73m2    Calcium 8.3 (L) 8.5 - 10.1 MG/DL    Bilirubin, total 0.4 0.2 - 1.0 MG/DL    ALT (SGPT) 16 16 - 61 U/L    AST (SGOT) 15 15 - 37 U/L    Alk. phosphatase 114 45 - 117 U/L    Protein, total 6.6 6.4 - 8.2 g/dL    Albumin 3.6 3.4 - 5.0 g/dL    Globulin 3.0 2.0 - 4.0 g/dL    A-G Ratio 1.2 0.8 - 1.7     PROTHROMBIN TIME + INR    Collection Time: 07/06/18 11:08 AM   Result Value Ref Range    Prothrombin time 13.0 11.5 - 15.2 sec    INR 1.0 0.8 - 1.2         Radiologic Studies -   XR CHEST PORT   Final Result            Medical Decision Making   I am the first provider for this patient. I reviewed the vital signs, available nursing notes, past medical history, past surgical history, family history and social history. Vital Signs-Reviewed the patient's vital signs. Pulse Oximetry Analysis -  100 on room air (Interpretation) Normal     Cardiac Monitor:  Rate: 70  Rhythm:  Normal Sinus Rhythm     EKG: Interpreted by the EP. Time Interpreted: 1058   Rate: 68   Rhythm: Normal Sinus Rhythm    Interpretation: LAD, LVH, ST abnormalities noted throughout and seen on prior ECG   Comparison: 5/11/18    Records Reviewed: Nursing Notes, Old Medical Records, Previous electrocardiograms, Previous Radiology Studies and Previous Laboratory Studies (Time of Review: 10:51 AM)    ED Course: Progress Notes, Reevaluation, and Consults:    Provider Notes (Medical Decision Making): Nonverbal patient with episode of syncope at physicians office (there for evaluation of syncope) earlier this morning. Per the family member and caretaker at bedside states this happens frequently and is in the process of evaluation. Labs here unremarkable and without obvious etiology of symptoms. ECG consistent with prior, multiple abnormalities but stable from prior. Likely cardiogenic in nature given history of heart disease. 12:16 PM  Spoke with family member at bedside who states \"this happens all the time, we don't do anything about it. \" The family member states the patient is DNR/DNI and understands that being discharged home today may result in death. She states she believes this is in line with her own and her family member's wishes and desires discharge at this point. Given lack of acute changes or obvious life threatening etiology, will plan to d/c patient for continued outpatient workup. Diagnosis     Clinical Impression:   1. Bradycardia    2. 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 Syncope and collapse        Disposition: Discharge Home     Follow-up Information     Follow up With Details Comments Contact Info    Richar Zapata MD Call today  [address] [facility]      Jaycee MD Viky Call to reschedule his appointment  [place] [id number] [phone]             Discharge Medication List as of 7/6/2018 12:29 PM      CONTINUE these medications which have NOT CHANGED    Details   losartan (COZAAR) 25 mg tablet Take 1 Tab by mouth daily. , Print, Disp-30 Tab, R-6      lamoTRIgine (LAMICTAL) 200 mg tablet Take  by mouth daily. , Historical Med      potassium chloride SR (KLOR-CON 10) 10 mEq tablet Take  by mouth., Historical Med      QUEtiapine (SEROQUEL) 25 mg tablet Take  by mouth., Historical Med      atorvastatin (LIPITOR) 10 mg tablet Take  by mouth daily. , Historical Med      cholecalciferol (VITAMIN D3) 1,000 unit tablet Take 1 Tab by mouth daily. , Print, Disp-30 Tab, R-0      citalopram (CELEXA) 20 mg tablet Take 1 Tab by mouth daily. , Print, Disp-30 Tab, R-0      donepezil (ARICEPT) 5 mg tablet Take 1 Tab by mouth nightly. , Print, Disp-30 Tab, R-0      LORazepam (ATIVAN) 0.5 mg tablet Take 1 Tab by mouth two (2) times daily as needed for Anxiety. , Print, Disp-30 Tab, R-0      simvastatin (ZOCOR) 10 mg tablet Take 1 Tab by mouth nightly. , Print, Disp-30 Tab, R-0      aspirin 81 mg chewable tablet Take 1 Tab by mouth daily. , Print, Disp-30 Tab, R-0      amLODIPine (NORVASC) 10 mg tablet Take 1 Tab by mouth daily. , Print, Disp-30 Tab, R-0           _______________________________

## 2018-07-06 NOTE — DISCHARGE INSTRUCTIONS
Fainting: Care Instructions  Your Care Instructions    When you faint, or pass out, you lose consciousness for a short time. A brief drop in blood flow to the brain often causes it. When you fall or lie down, more blood flows to your brain and you regain consciousness. Emotional stress, pain, or overheating-especially if you have been standing-can make you faint. In these cases, fainting is usually not serious. But fainting can be a sign of a more serious problem. Your doctor may want you to have more tests to rule out other causes. The treatment you need depends on the reason why you fainted. The doctor has checked you carefully, but problems can develop later. If you notice any problems or new symptoms, get medical treatment right away. Follow-up care is a key part of your treatment and safety. Be sure to make and go to all appointments, and call your doctor if you are having problems. It's also a good idea to know your test results and keep a list of the medicines you take. How can you care for yourself at home? · Drink plenty of fluids to prevent dehydration. If you have kidney, heart, or liver disease and have to limit fluids, talk with your doctor before you increase your fluid intake. When should you call for help? Call 911 anytime you think you may need emergency care. For example, call if:  ? · You have symptoms of a heart problem. These may include:  ¨ Chest pain or pressure. ¨ Severe trouble breathing. ¨ A fast or irregular heartbeat. ¨ Lightheadedness or sudden weakness. ¨ Coughing up pink, foamy mucus. ¨ Passing out. After you call 911, the  may tell you to chew 1 adult-strength or 2 to 4 low-dose aspirin. Wait for an ambulance. Do not try to drive yourself. ? · You have symptoms of a stroke. These may include:  ¨ Sudden numbness, tingling, weakness, or loss of movement in your face, arm, or leg, especially on only one side of your body. ¨ Sudden vision changes.   ¨ Sudden trouble speaking. ¨ Sudden confusion or trouble understanding simple statements. ¨ Sudden problems with walking or balance. ¨ A sudden, severe headache that is different from past headaches. ? · You passed out (lost consciousness) again. ? Watch closely for changes in your health, and be sure to contact your doctor if:  ? · You do not get better as expected. Where can you learn more? Go to http://sharif-delilah.info/. Enter J765 in the search box to learn more about \"Fainting: Care Instructions. \"  Current as of: March 20, 2017  Content Version: 11.4  © 3527-2409 [employer]. Care instructions adapted under license by [employer] (which disclaims liability or warranty for this information). If you have questions about a medical condition or this instruction, always ask your healthcare professional. [address] any warranty or liability for your use of this information.

## 2018-07-06 NOTE — ED NOTES
I have reviewed discharge instructions with the patient and niece. The patient and niece verbalized understanding. Patient armband removed and shredded, no scripts given.

## 2018-10-26 ENCOUNTER — OFFICE VISIT (OUTPATIENT)
Dept: CARDIOLOGY CLINIC | Age: 83
End: 2018-10-26

## 2018-10-26 ENCOUNTER — TELEPHONE (OUTPATIENT)
Dept: CARDIOLOGY CLINIC | Age: 83
End: 2018-10-26

## 2018-10-26 VITALS
WEIGHT: 125 LBS | OXYGEN SATURATION: 98 % | BODY MASS INDEX: 17.5 KG/M2 | DIASTOLIC BLOOD PRESSURE: 87 MMHG | HEIGHT: 71 IN | HEART RATE: 77 BPM | SYSTOLIC BLOOD PRESSURE: 165 MMHG

## 2018-10-26 DIAGNOSIS — I10 HYPERTENSION, UNSPECIFIED TYPE: Primary | ICD-10-CM

## 2018-10-26 DIAGNOSIS — I42.9 CARDIOMYOPATHY, UNSPECIFIED TYPE (HCC): ICD-10-CM

## 2018-10-26 NOTE — PROGRESS NOTES
[facility] Mr. Telma Alford is an 81 y.o. male with a history of cardiomyopathy, Alzheimer's dementia, adjustment disorder, hypertension. Mr. Telma Alford is here today for follow up appointment. Accompanied with family member No angina or heart failure symptoms Using 1 pillow at night No LE edema No syncope Denies any nausea, vomiting, abdominal pain, fever, chills, sputum production. No hematuria or other bleeding complaints Past Medical History:  
Diagnosis Date  Adjustment disorder  Alzheimer's dementia  Cardiomyopathy ([facility] [address] LVEF 45% (06/18)  Hypertension  Vitamin D deficiency No past surgical history on file. Current Outpatient Medications Medication Sig  
 losartan (COZAAR) 25 mg tablet Take 1 Tab by mouth daily.  lamoTRIgine (LAMICTAL) 200 mg tablet Take  by mouth daily.  potassium chloride SR (KLOR-CON 10) 10 mEq tablet Take  by mouth.  QUEtiapine (SEROQUEL) 25 mg tablet Take  by mouth.  atorvastatin (LIPITOR) 10 mg tablet Take  by mouth daily.  cholecalciferol (VITAMIN D3) 1,000 unit tablet Take 1 Tab by mouth daily.  citalopram (CELEXA) 20 mg tablet Take 1 Tab by mouth daily.  donepezil (ARICEPT) 5 mg tablet Take 1 Tab by mouth nightly.  LORazepam (ATIVAN) 0.5 mg tablet Take 1 Tab by mouth two (2) times daily as needed for Anxiety.  simvastatin (ZOCOR) 10 mg tablet Take 1 Tab by mouth nightly.  aspirin 81 mg chewable tablet Take 1 Tab by mouth daily.  amLODIPine (NORVASC) 10 mg tablet Take 1 Tab by mouth daily. No current facility-administered medications for this visit. Allergies and Sensitivities: 
Allergies Allergen Reactions  Lisinopril Unknown (comments) Family History: No family history on file. Social History: 
Social History Tobacco Use  Smoking status: Never Smoker  Smokeless tobacco: Never Used Substance Use Topics  Alcohol use: Not on file  Drug use: Not on file He  reports that  has never smoked. he has never used smokeless tobacco.  He  has no alcohol history on file. Review of Systems: 
Cardiac symptoms as noted above in HPI. All others negative. Denies  blurring vision, photophobia, neck pain, hemoptysis, chronic cough, nausea, vomiting, hematuria, burning micturition, BRBPR, chronic headaches. Physical Exam: 
BP Readings from Last 3 Encounters:  
07/06/18 126/59  
06/15/18 151/75  
05/11/18 141/75 Pulse Readings from Last 3 Encounters:  
07/06/18 70  
06/15/18 81  
05/11/18 97 Wt Readings from Last 3 Encounters:  
06/15/18 129 lb (58.5 kg) 05/11/18 129 lb (58.5 kg) 11/04/13 130 lb 12.8 oz (59.3 kg) Constitutional: Oriented to person, place, and time. HENT: Head: Normocephalic and atraumatic. Neck: No JVD present. Cardiovascular: Regular rhythm. No murmur, gallop or rubs appreciated Lung: Breath sounds normal. No respiratory distress. No ronchi or rales appreciated Abdominal: No tenderness. No rebound and no guarding. Musculoskeletal: There is no lower extremity edema. No cynosis Review of Data LABS:  
Lab Results Component Value Date/Time Sodium 146 (H) 07/06/2018 11:08 AM  
 Potassium 3.4 (L) 07/06/2018 11:08 AM  
 Chloride 108 07/06/2018 11:08 AM  
 CO2 29 07/06/2018 11:08 AM  
 Glucose 149 (H) 07/06/2018 11:08 AM  
 BUN 17 07/06/2018 11:08 AM  
 Creatinine 1.33 (H) 07/06/2018 11:08 AM  
 
No flowsheet data found. Lab Results Component Value Date/Time ALT (SGPT) 16 07/06/2018 11:08 AM  
 
Lab Results Component Value Date/Time Hemoglobin A1c 5.6 11/02/2013 10:15 AM  
 
 
EKG 
(05/18) Sinus rhythm at 80 bpm. LVH with repolarization abnormality. Possible old septal infarct. ECHO (05/18) LEFT VENTRICLE: Size was normal. Ejection fraction was estimated to be 45 %. There was diffuse hypokinesis. RIGHT VENTRICLE: The size was normal. Systolic function was normal. There  
were no regional wall motion abnormalities. LEFT ATRIUM: Size was normal. 
RIGHT ATRIUM: Size was normal. 
MITRAL VALVE: Normal valve structure. DOPPLER: There was trivial regurgitation. AORTIC VALVE: Leaflets exhibited mildly increased thickness. DOPPLER: There was mild to moderate regurgitation. IMPRESSION & PLAN: 
Mr. Travis Maxwell is an 81 y.o. male with a history of hypertension, Alzheimer's disease and dementia. Cardiomyopathy: 
Diagnosed in 06/18 with Ef 45% Likely non-ischemic, hypertensive cardiomyopathy On amlodipine Started lisinopril 2.5 mg daily on past but stopped because of dry cough. Now on losartan No fluid overload on exam. 
 
Hypertension:  Currently he is on Amlodipine 10 mg daily and losartan. BP today 165/87 mm Hg. Repeat BP was 142/82 mm Hg. Thank you for allowing me to participate in patient care. Please feel free to call me if you have any question or concern. Tabatha Reynolds MD 
Please note: This document has been produced using voice recognition software. Unrecognized errors in transcription may be present.

## 2019-09-24 ENCOUNTER — OFFICE VISIT (OUTPATIENT)
Dept: CARDIOLOGY CLINIC | Age: 84
End: 2019-09-24

## 2019-09-24 DIAGNOSIS — I10 ESSENTIAL HYPERTENSION: Primary | ICD-10-CM

## 2019-09-24 RX ORDER — ACETAMINOPHEN 325 MG/1
TABLET ORAL
COMMUNITY

## 2019-09-24 RX ORDER — FACIAL-BODY WIPES
10 EACH TOPICAL DAILY
COMMUNITY

## 2019-09-24 RX ORDER — LACTULOSE 10 G/15ML
SOLUTION ORAL; RECTAL AS NEEDED
COMMUNITY

## 2019-09-24 RX ORDER — DIVALPROEX SODIUM 125 MG/1
125 CAPSULE, COATED PELLETS ORAL
COMMUNITY

## 2019-09-24 RX ORDER — CHLORTHALIDONE 25 MG/1
12.5 TABLET ORAL DAILY
Qty: 30 TAB | Refills: 6 | Status: SHIPPED | OUTPATIENT
Start: 2019-09-24

## 2019-09-24 NOTE — PROGRESS NOTES
[facility]    Mr. Lilian Meza is an 81 y.o. male with a history of cardiomyopathy, Alzheimer's dementia, adjustment disorder, hypertension. Mr. Lilian Meza is here today for follow up appointment. Accompanied with family member who has power of   No angina or heart failure symptoms per patient and family member  Using 1 pillow at night  Occasional ankle edema  No syncope  Denies any nausea, vomiting, abdominal pain, fever, chills, sputum production. No hematuria or other bleeding complaints    Past Medical History:   Diagnosis Date    Adjustment disorder     Alzheimer's dementia     Cardiomyopathy ([facility] [address]     LVEF 45% (06/18)     Hypertension     Vitamin D deficiency          No past surgical history on file. Current Outpatient Medications   Medication Sig    lactulose (CHRONULAC) 10 gram/15 mL solution Take  by mouth as needed.  bisacodyl (DULCOLAX) 10 mg suppository Insert 10 mg into rectum daily.  divalproex (DEPAKOTE SPRINKLE) 125 mg capsule Take 125 mg by mouth. Six capsules bid    B1,B2,B3,B6,P48-sypgur-Os-spbw (ELDERTONIC) 0.5-0.6-7-0.7 mg elix Take 15 mL by mouth two (2) times a day.  potassium chloride (KLOR-CON PO) Take 7.5 mL by mouth. Liquid    acetaminophen (TYLENOL) 325 mg tablet Take  by mouth every four (4) hours as needed for Pain.  losartan (COZAAR) 25 mg tablet Take 1 Tab by mouth daily.  atorvastatin (LIPITOR) 10 mg tablet Take  by mouth daily.  cholecalciferol (VITAMIN D3) 1,000 unit tablet Take 1 Tab by mouth daily.  aspirin 81 mg chewable tablet Take 1 Tab by mouth daily.  amLODIPine (NORVASC) 10 mg tablet Take 1 Tab by mouth daily.  lamoTRIgine (LAMICTAL) 200 mg tablet Take  by mouth daily.  potassium chloride SR (KLOR-CON 10) 10 mEq tablet Take  by mouth.  QUEtiapine (SEROQUEL) 25 mg tablet Take  by mouth.  citalopram (CELEXA) 20 mg tablet Take 1 Tab by mouth daily.     donepezil (ARICEPT) 5 mg tablet Take 1 Tab by mouth nightly.  LORazepam (ATIVAN) 0.5 mg tablet Take 1 Tab by mouth two (2) times daily as needed for Anxiety.  simvastatin (ZOCOR) 10 mg tablet Take 1 Tab by mouth nightly. No current facility-administered medications for this visit. Allergies and Sensitivities:  Allergies   Allergen Reactions    Lisinopril Unknown (comments)       Family History:  No family history on file. Social History:  Social History     Tobacco Use    Smoking status: Never Smoker    Smokeless tobacco: Never Used   Substance Use Topics    Alcohol use: Not on file    Drug use: Not on file     He  reports that he has never smoked. He has never used smokeless tobacco.  He  has no alcohol history on file. Review of Systems:  Cardiac symptoms as noted above in HPI. All others negative. Denies  blurring vision, photophobia, neck pain, hemoptysis, chronic cough, nausea, vomiting, hematuria, burning micturition, BRBPR, chronic headaches. Physical Exam:  BP Readings from Last 3 Encounters:   10/26/18 165/87   07/06/18 126/59   06/15/18 151/75         Pulse Readings from Last 3 Encounters:   10/26/18 77   07/06/18 70   06/15/18 81          Wt Readings from Last 3 Encounters:   10/26/18 125 lb (56.7 kg)   06/15/18 129 lb (58.5 kg)   05/11/18 129 lb (58.5 kg)       Constitutional: Oriented to person, place, and time. HENT: Head: Normocephalic and atraumatic. Neck: No JVD present. Cardiovascular: Regular rhythm. No murmur, gallop or rubs appreciated  Lung: Breath sounds normal. No respiratory distress. No ronchi or rales appreciated  Abdominal: No tenderness. No rebound and no guarding. Musculoskeletal: There is trace/1+ lower extremity edema.  No cynosis      Review of Data  LABS:   Lab Results   Component Value Date/Time    Sodium 146 (H) 07/06/2018 11:08 AM    Potassium 3.4 (L) 07/06/2018 11:08 AM    Chloride 108 07/06/2018 11:08 AM    CO2 29 07/06/2018 11:08 AM Glucose 149 (H) 07/06/2018 11:08 AM    BUN 17 07/06/2018 11:08 AM    Creatinine 1.33 (H) 07/06/2018 11:08 AM     No flowsheet data found. Lab Results   Component Value Date/Time    ALT (SGPT) 16 07/06/2018 11:08 AM     Lab Results   Component Value Date/Time    Hemoglobin A1c 5.6 11/02/2013 10:15 AM       EKG  (05/18) Sinus rhythm at 80 bpm. LVH with repolarization abnormality. Possible old septal infarct. ECHO (05/18)  LEFT VENTRICLE: Size was normal. Ejection fraction was estimated to be 45 %. There was diffuse hypokinesis. RIGHT VENTRICLE: The size was normal. Systolic function was normal. There   were no regional wall motion abnormalities. LEFT ATRIUM: Size was normal.  RIGHT ATRIUM: Size was normal.  MITRAL VALVE: Normal valve structure. DOPPLER: There was trivial regurgitation. AORTIC VALVE: Leaflets exhibited mildly increased thickness. DOPPLER: There was mild to moderate regurgitation. IMPRESSION & PLAN:  Mr. Jaime Blackmon is an 81 y.o. male with a history of hypertension, Alzheimer's disease and dementia. Cardiomyopathy:  Diagnosed in 06/18 with Ef 45%  Likely non-ischemic, hypertensive cardiomyopathy  On amlodipine and losartan  Started lisinopril 2.5 mg daily on past but stopped because of dry cough. Slight ankle edema on exam.  Will use chlorthalidone 12.5 mg for diuresis and for hypertension. BMP will be checked in 2 weeks    Hypertension: BP elevated. Will start chlorthalidone 12.5 mg daily for hypertension and slight edema. Continue rest of the medication    Thank you for allowing me to participate in patient care. Please feel free to call me if you have any question or concern. Carlyle Suggs MD  Please note: This document has been produced using voice recognition software. Unrecognized errors in transcription may be present.

## 2019-09-24 NOTE — PROGRESS NOTES
1. Have you been to the ER, urgent care clinic since your last visit? Hospitalized since your last visit? No     2. Have you seen or consulted any other health care providers outside of the [address] [place] [address] since your last visit? Include any pap smears or colon screening.   No

## 2019-11-11 ENCOUNTER — HOSPITAL ENCOUNTER (OUTPATIENT)
Dept: PHYSICAL THERAPY | Age: 84
Discharge: HOME | End: 2019-11-11
Payer: MEDICARE

## 2019-11-11 ENCOUNTER — HOSPITAL ENCOUNTER (OUTPATIENT)
Dept: GENERAL RADIOLOGY | Age: 84
Discharge: HOME | End: 2019-11-11
Payer: MEDICARE

## 2019-11-11 DIAGNOSIS — R13.12 OROPHARYNGEAL DYSPHAGIA: ICD-10-CM

## 2019-11-11 PROCEDURE — 92611 MOTION FLUOROSCOPY/SWALLOW: CPT

## 2019-11-11 PROCEDURE — 74230 X-RAY XM SWLNG FUNCJ C+: CPT

## 2019-11-11 PROCEDURE — 74011000255 HC RX REV CODE- 255

## 2019-11-11 RX ADMIN — BARIUM SULFATE 30 ML: 0.81 POWDER, FOR SUSPENSION ORAL at 10:33

## 2019-11-11 RX ADMIN — BARIUM SULFATE 15 ML: 400 SUSPENSION ORAL at 10:33

## 2019-11-11 NOTE — PROGRESS NOTES
[facility]– [place]  Pieter Osborn [facility], [facility], [facility] [address]  Ph: [phone] Fax: [phone]       Outpatient Modified Barium Swallow Evaluation      Shantal Zambrano        Date: 2019   : 1935    PMHx: as per chart at facility    HISTORY:   Dementia, Right lower lobe PNA on CXR 19    Radiologist: Guerda Oliver Procedures  [x] Lateral View   [] A-P View [] Scanned to level of Sternum    [x] Seated at 90 deg.   [] Other:    Presentation:   [x] Spoon: nectar   [] Cup   [x] Straw: thin   [] Syringe   [] Consecutive Swallows  [] Other:    Consistencies:   [x] Ba+ liquid   [x] Ba+ liquid (nectar)   [] Ba+ liquid (honey)     [] Ba+ pudding   [] Ba+ crunched cookie   [] Ba+ cookie   [] Other:     Testing Discontinued: [x] Due to: gross, reginaldo, silent aspiration prior to swallow response of almost entirety of nectar thick presentation (3/4 tsp)    Treatment Techniques Attempted     [] Head Turn: [] Right [] Left     [] Head Tilt: [] Right [] Left     [] Chin Down:  [] Thermal Sensitization:  [x] Supraglottic Swallow: pt unable to follow  [] Mendelson's Maneuver:  [x] Other: repeat swallow: pt unable to follow    Results  Dysphagia Present:    [x] Yes  [] No    Ratings of Dysphagia:    [] Mild  [] Moderate [x] Severe/ Profound    Stages of Breakdown:   [x] Oral      [x] Oral Preparatory [x] Pharyngeal   [] Esophageal    Aspiration: [x] Yes    [] No  [] At Risk  [] Trace (<10%) [x] Significant (>10%):  95%  [] Penetration: Level of:   Cough: [] Yes      [x] No    Consistency Aspirated:  [x] Thin Liquid   [x] Nectar   [] Honey   [] Pureed     [] Mech-soft  [] Solid    Motility Problems with:  [] Lip Closure:   [] Sucking:   [x] Mastication:   [x] Bolus Formation:   [x] Bolus Control:  [x] A-P Transport:  [x] Posterior Tongue Elevation:  [x] Swallow Response (delayed):  [] Velopharyngeal Closure:  [x] Laryngeal Elevation:  [x] Laryngeal Adduction:  [x] Cricopharyngeal Relaxation:  [] Esophageal Peristalsis:  [] Reflux:  [] Other:    Timing of Aspiration:  [x] Before Swallow: gross, reginaldo, silent of nectar via 3/4 tsp (almost entirety of presentation)  [x] During Swallow: reginaldo, silent of thin  [x] After Swallow: following both thin and nectar, silent, from spillage of valleculae and pyriform sinus residue    Transit Time Delay:  [x] >1 Second  Oral  [x] >1 Second Pharyngeal  [] >20 Second Esophageal     Residuals: max for all, unable to clear with cued repeat swallow  [x] Buccal Cavity   [x] Velum/posterior pharyngeal wall  [x] Valleculae  [x] Pyriforms    Pain:  Pain start of study:0  Pain end of study:0    The severity rating is based on the following outcomes:    PAMELA Noms - Swallow Level 1  8-point Penetration-Aspiration Scale Score:8  Clinical Judgement    RESULTS/RECOMMENDATIONS     Pt presents with profound oropharyngeal dysphagia with gross, reginaldo, silent aspiration prior to, during, and after the swallow of thin via straw and nectar thick via 3/4 tsp. Pt with severely delayed oral manipulation, severely prolonged A-P transit, severely delayed swallow response with premature spillage of thin to completely fill valleculae, and into upper laryngeal vestibule and pyriform sinuses with spillage below vocal cords prior to swallow response, premature spillage of 95% of nectar presentation prior to swallow response straight below vocal cords, silently, minimal epiglottic movement with silent aspiration during the swallow, minimal UES opening resulting in max valleculae and pyriform sinus residue which spilled below vocal cords following swallow response, silently. Pt unable to follow verbal/ tactile commands to complete compensatory swallow strategies. Pt not safe for po feeds at this time. Rec: NPO, ice chips PRN for oral care/ comfort/ swallow stim until decision made re: nutrition.        Thank you for this referral,  Sukhwinder Muniz MS, CCC/SLP  Speech- Language Pathologist